# Patient Record
Sex: FEMALE | Race: WHITE | Employment: UNEMPLOYED | URBAN - METROPOLITAN AREA
[De-identification: names, ages, dates, MRNs, and addresses within clinical notes are randomized per-mention and may not be internally consistent; named-entity substitution may affect disease eponyms.]

---

## 2017-09-12 ENCOUNTER — HOSPITAL ENCOUNTER (OUTPATIENT)
Dept: MAMMOGRAPHY | Age: 52
Discharge: HOME | End: 2017-09-12
Attending: OBSTETRICS & GYNECOLOGY
Payer: COMMERCIAL

## 2017-09-12 DIAGNOSIS — Z12.31 VISIT FOR SCREENING MAMMOGRAM: ICD-10-CM

## 2017-09-12 PROCEDURE — 77067 SCR MAMMO BI INCL CAD: CPT | Performed by: OBSTETRICS & GYNECOLOGY

## 2018-12-21 ENCOUNTER — OFFICE VISIT (OUTPATIENT)
Dept: FAMILY MEDICINE CLINIC | Facility: CLINIC | Age: 53
End: 2018-12-21
Payer: COMMERCIAL

## 2018-12-21 VITALS
OXYGEN SATURATION: 98 % | BODY MASS INDEX: 24.8 KG/M2 | SYSTOLIC BLOOD PRESSURE: 98 MMHG | RESPIRATION RATE: 16 BRPM | HEART RATE: 98 BPM | TEMPERATURE: 98 F | DIASTOLIC BLOOD PRESSURE: 58 MMHG | WEIGHT: 140 LBS | HEIGHT: 63 IN

## 2018-12-21 DIAGNOSIS — J01.00 ACUTE MAXILLARY SINUSITIS, RECURRENCE NOT SPECIFIED: Primary | ICD-10-CM

## 2018-12-21 DIAGNOSIS — R05.9 COUGH: ICD-10-CM

## 2018-12-21 PROCEDURE — 99203 OFFICE O/P NEW LOW 30 MIN: CPT | Performed by: PHYSICIAN ASSISTANT

## 2018-12-21 RX ORDER — PSEUDOEPHEDRINE HYDROCHLORIDE 30 MG/1
30 TABLET ORAL EVERY 4 HOURS PRN
COMMUNITY
End: 2020-05-05 | Stop reason: ALTCHOICE

## 2018-12-21 RX ORDER — BENZONATATE 200 MG/1
200 CAPSULE ORAL 3 TIMES DAILY PRN
Qty: 21 CAPSULE | Refills: 0 | Status: SHIPPED | OUTPATIENT
Start: 2018-12-21 | End: 2018-12-28

## 2018-12-21 RX ORDER — AMOXICILLIN AND CLAVULANATE POTASSIUM 875; 125 MG/1; MG/1
1 TABLET, FILM COATED ORAL 2 TIMES DAILY
Qty: 20 TABLET | Refills: 0 | Status: SHIPPED | OUTPATIENT
Start: 2018-12-21 | End: 2018-12-31

## 2018-12-21 NOTE — PATIENT INSTRUCTIONS
Self-Care for Sinusitis     Drinking plenty of water can help sinuses drain. Sinusitis can often be managed with self-care. Self-care can keep sinuses moist and make you feel more comfortable. Remember to follow your doctor's instructions closely.  This Colds, flu, and allergies make it more likely for you to get sinusitis. Do your best to prevent sinusitis by preventing these problems. Do what you can to avoid getting colds and other infections. Stay away from things that cause allergies (allergens).  Edita Dowling · Stay away from all types of smoke, which dries out sinus linings. This includes tobacco smoke and chemical smoke in workplace settings. · Use saltwater rinses. Date Last Reviewed: 10/1/2016  © 4185-5867 [employer].  [address] [facility]

## 2019-09-26 ENCOUNTER — HOSPITAL ENCOUNTER (OUTPATIENT)
Age: 54
Discharge: HOME | End: 2019-09-26
Payer: COMMERCIAL

## 2019-09-26 VITALS
WEIGHT: 130 LBS | SYSTOLIC BLOOD PRESSURE: 106 MMHG | HEIGHT: 62 IN | TEMPERATURE: 97 F | RESPIRATION RATE: 18 BRPM | OXYGEN SATURATION: 98 % | BODY MASS INDEX: 23.92 KG/M2 | DIASTOLIC BLOOD PRESSURE: 72 MMHG | HEART RATE: 83 BPM

## 2019-09-26 DIAGNOSIS — S61.011A LACERATION OF RIGHT THUMB WITHOUT FOREIGN BODY WITHOUT DAMAGE TO NAIL, INITIAL ENCOUNTER: Primary | ICD-10-CM

## 2019-09-26 PROCEDURE — 99213 OFFICE O/P EST LOW 20 MIN: CPT

## 2019-09-26 PROCEDURE — 12002 RPR S/N/AX/GEN/TRNK2.6-7.5CM: CPT

## 2019-09-26 PROCEDURE — 90471 IMMUNIZATION ADMIN: CPT

## 2019-09-27 NOTE — ED PROVIDER NOTES
Patient Seen in: [facility] In [facility]      History   Patient presents with:  Laceration Abrasion (integumentary)    Stated Complaint:  RIGHT HAND LACERATION    81-year-old female who presents to the immediate care with a laceration to the Current:/72   Pulse 83   Temp 96.8 °F (36 °C) (Temporal)   Resp 18   Ht 157.5 cm (5' 2\")   Wt 59 kg   LMP 08/26/2019 (Exact Date)   SpO2 98%   BMI 23.78 kg/m²         Physical Exam   Constitutional: She is oriented to person, place, and time.  She ap I have discussed with the patient and/or family the results of test, differential diagnosis, treatment plan, warning signs and symptoms which should prompt immediate return.   The patient and/or family expressed clear understanding of these instructions and

## 2019-09-27 NOTE — ED INITIAL ASSESSMENT (HPI)
Patient sustained a laceration to the right thumb on a broken wine glass. CMS adequate. Flap type laceration noted.

## 2019-10-08 ENCOUNTER — HOSPITAL ENCOUNTER (OUTPATIENT)
Age: 54
Discharge: HOME | End: 2019-10-08
Payer: COMMERCIAL

## 2019-10-08 VITALS
DIASTOLIC BLOOD PRESSURE: 66 MMHG | HEART RATE: 73 BPM | RESPIRATION RATE: 20 BRPM | OXYGEN SATURATION: 100 % | TEMPERATURE: 98 F | SYSTOLIC BLOOD PRESSURE: 107 MMHG

## 2019-10-08 DIAGNOSIS — Z48.02 ENCOUNTER FOR REMOVAL OF SUTURES: Primary | ICD-10-CM

## 2019-10-08 NOTE — ED INITIAL ASSESSMENT (HPI)
Patient presents for suture removal to right thumb x 12 days. Right hand dominent. Sutures appear dry and well approximated.

## 2019-10-08 NOTE — ED PROVIDER NOTES
Patient Seen in: [facility] In [facility]      History   Patient presents with:  Sut Stap RingRemoval (ingtegumentary)    Stated Complaint: stitch removal, [id number]     HPI    29-year-old female here for suture removal after 8 sutures ( in lie reactive to light. Conjunctivae and EOM are normal.   Neck: Normal range of motion. Neck supple. Cardiovascular: Normal rate, regular rhythm and normal heart sounds.    Pulmonary/Chest: Effort normal and breath sounds normal.   Neurological: She is alert

## 2020-05-05 ENCOUNTER — APPOINTMENT (OUTPATIENT)
Dept: GENERAL RADIOLOGY | Age: 55
End: 2020-05-05
Attending: FAMILY MEDICINE
Payer: COMMERCIAL

## 2020-05-05 ENCOUNTER — HOSPITAL ENCOUNTER (OUTPATIENT)
Age: 55
Discharge: HOME | End: 2020-05-05
Attending: FAMILY MEDICINE
Payer: COMMERCIAL

## 2020-05-05 VITALS
OXYGEN SATURATION: 98 % | SYSTOLIC BLOOD PRESSURE: 124 MMHG | RESPIRATION RATE: 20 BRPM | TEMPERATURE: 98 F | DIASTOLIC BLOOD PRESSURE: 53 MMHG | HEART RATE: 70 BPM

## 2020-05-05 DIAGNOSIS — S93.402A SPRAIN OF LEFT ANKLE, UNSPECIFIED LIGAMENT, INITIAL ENCOUNTER: ICD-10-CM

## 2020-05-05 DIAGNOSIS — S82.892A CLOSED AVULSION FRACTURE OF LEFT ANKLE, INITIAL ENCOUNTER: Primary | ICD-10-CM

## 2020-05-05 PROCEDURE — 99214 OFFICE O/P EST MOD 30 MIN: CPT

## 2020-05-05 PROCEDURE — 73610 X-RAY EXAM OF ANKLE: CPT | Performed by: FAMILY MEDICINE

## 2020-05-05 NOTE — ED PROVIDER NOTES
Patient Seen in: [facility] Immediate [facility] In [facility]      History   Patient presents with:   Ankle Injury    Stated Complaint: Left ankle pain,injury    HPI  This is a 62-year-old female coming in with complaints of left ankle pain, injury happened on Ap normocephalic  NECK: supple  LUNGS: No tachypnea   CARDIO: No tachycardia   GI: not distended     EXTREMITIES:   L ankle and foot exam:   - defect/deformity : none noted   - swelling/effusion: ++ noted along the soft tissue of the lateral malleolus   - bru fibula and the lateral cortical margin of the talus, suggesting a small avulsion injury, likely from the region of the ATF ligament insertion. However this finding is of uncertain acuity. No additional osseous lesions are identified.   No discrete radiogr

## 2020-05-05 NOTE — ED INITIAL ASSESSMENT (HPI)
States rolled left ankle 4/8. Wore an ortho boot for 2 days after injury, then stopped. States initial eccymosis all over ankle and lateral foot, now traveled down to toes and cleared at ankle and foot.   Ambulatory with mild limp  States here because not

## 2020-09-08 PROCEDURE — 88175 CYTOPATH C/V AUTO FLUID REDO: CPT | Performed by: NURSE PRACTITIONER

## 2020-09-08 PROCEDURE — 87624 HPV HI-RISK TYP POOLED RSLT: CPT | Performed by: NURSE PRACTITIONER

## 2020-09-22 ENCOUNTER — APPOINTMENT (OUTPATIENT)
Dept: LAB | Age: 55
End: 2020-09-22
Attending: NURSE PRACTITIONER
Payer: COMMERCIAL

## 2020-09-22 ENCOUNTER — HOSPITAL ENCOUNTER (OUTPATIENT)
Dept: MAMMOGRAPHY | Age: 55
Discharge: HOME | End: 2020-09-22
Attending: NURSE PRACTITIONER
Payer: COMMERCIAL

## 2020-09-22 DIAGNOSIS — Z12.31 VISIT FOR SCREENING MAMMOGRAM: ICD-10-CM

## 2020-09-22 PROCEDURE — 80048 BASIC METABOLIC PNL TOTAL CA: CPT | Performed by: NURSE PRACTITIONER

## 2020-09-22 PROCEDURE — 82670 ASSAY OF TOTAL ESTRADIOL: CPT | Performed by: NURSE PRACTITIONER

## 2020-09-22 PROCEDURE — 85025 COMPLETE CBC W/AUTO DIFF WBC: CPT | Performed by: NURSE PRACTITIONER

## 2020-09-22 PROCEDURE — 83002 ASSAY OF GONADOTROPIN (LH): CPT | Performed by: NURSE PRACTITIONER

## 2020-09-22 PROCEDURE — 77067 SCR MAMMO BI INCL CAD: CPT | Performed by: NURSE PRACTITIONER

## 2020-09-22 PROCEDURE — 84443 ASSAY THYROID STIM HORMONE: CPT | Performed by: NURSE PRACTITIONER

## 2020-09-22 PROCEDURE — 83001 ASSAY OF GONADOTROPIN (FSH): CPT | Performed by: NURSE PRACTITIONER

## 2020-09-22 PROCEDURE — 80061 LIPID PANEL: CPT | Performed by: NURSE PRACTITIONER

## 2020-09-22 PROCEDURE — 36415 COLL VENOUS BLD VENIPUNCTURE: CPT | Performed by: NURSE PRACTITIONER

## 2020-09-22 PROCEDURE — 77063 BREAST TOMOSYNTHESIS BI: CPT | Performed by: NURSE PRACTITIONER

## 2020-11-04 ENCOUNTER — APPOINTMENT (OUTPATIENT)
Dept: LAB | Age: 55
End: 2020-11-04
Attending: NURSE PRACTITIONER
Payer: COMMERCIAL

## 2020-11-04 PROCEDURE — 36415 COLL VENOUS BLD VENIPUNCTURE: CPT | Performed by: NURSE PRACTITIONER

## 2020-11-04 PROCEDURE — 85025 COMPLETE CBC W/AUTO DIFF WBC: CPT | Performed by: NURSE PRACTITIONER

## 2020-11-10 ENCOUNTER — TELEMEDICINE (OUTPATIENT)
Dept: INTERNAL MEDICINE CLINIC | Facility: CLINIC | Age: 55
End: 2020-11-10
Payer: COMMERCIAL

## 2020-11-10 DIAGNOSIS — Z20.822 ENCOUNTER BY TELEHEALTH FOR SUSPECTED COVID-19: Primary | ICD-10-CM

## 2020-11-10 PROCEDURE — 99213 OFFICE O/P EST LOW 20 MIN: CPT | Performed by: NURSE PRACTITIONER

## 2020-11-11 ENCOUNTER — LAB ENCOUNTER (OUTPATIENT)
Dept: LAB | Facility: HOSPITAL | Age: 55
End: 2020-11-11
Attending: NURSE PRACTITIONER
Payer: COMMERCIAL

## 2020-11-11 DIAGNOSIS — Z20.822 EXPOSURE TO COVID-19 VIRUS: Primary | ICD-10-CM

## 2021-08-09 ENCOUNTER — TELEPHONE (OUTPATIENT)
Dept: INTERNAL MEDICINE CLINIC | Facility: CLINIC | Age: 56
End: 2021-08-09

## 2021-08-09 NOTE — TELEPHONE ENCOUNTER
Incoming fax with patients Covid vaccination     Abhinav [id number]  Lot #- [id number]  Expires-11/9/2021     Left arm    Epic updated    Sent to scan

## 2021-09-13 ENCOUNTER — TELEPHONE (OUTPATIENT)
Dept: INTERNAL MEDICINE CLINIC | Facility: CLINIC | Age: 56
End: 2021-09-13

## 2021-09-13 NOTE — TELEPHONE ENCOUNTER
Incoming fax from [id number] [place] [address] [address]    Patients Covid vaccine record     2nd vaccine done on 9/11/2021     Lot #-[id number]    NDC_ [id number]    Exp date- 1/22/2022     Epic updated

## 2024-09-06 NOTE — PROGRESS NOTES
Virtual Telephone Check-In    Kylah Cuevas verbally consents to a Virtual/Telephone Check-In visit on 11/10/20. Patient verified identification with name and date of birth.      Patient understands and accepts financial responsibility for any deductible, c BY PCR (); Future      The patient is asked to call if any worsening in symptoms, SOB, CP. To self-isolate for 10 days since symptom onset. Encourage adequate rest and fluids, bland diet.  The patient indicates understanding of these issues and agrees Satisfactory

## 2024-12-09 NOTE — PROGRESS NOTES
CHIEF COMPLAINT:   Patient presents with:  Sinusitis    HPI:   Richard Scott is a 47year old female who presents for sinus congestion for  10  days. Symptoms have been worsening since onset.  Sinus congestion/pain is described as a pressure and is located BP 98/58 (BP Location: Left arm)   Pulse 98   Temp 98.2 °F (36.8 °C) (Oral)   Resp 16   Ht 63\"   Wt 140 lb   LMP 12/07/2018 (Approximate)   SpO2 98%   Breastfeeding?  No   BMI 24.80 kg/m²   GENERAL: well developed, well nourished, and in no apparent distre Sinusitis can often be managed with self-care. Self-care can keep sinuses moist and make you feel more comfortable. Remember to follow your doctor's instructions closely. This can make a big difference in getting your sinus problem under control.   Drink fl Colds, flu, and allergies make it more likely for you to get sinusitis. Do your best to prevent sinusitis by preventing these problems. Do what you can to avoid getting colds and other infections. Stay away from things that cause allergies (allergens).  Lin Parker · Stay away from all types of smoke, which dries out sinus linings. This includes tobacco smoke and chemical smoke in workplace settings. · Use saltwater rinses. Date Last Reviewed: 10/1/2016  © 2741-4262 [employer].  [address] [facility] [id number] OBS or IP - moderate complexity OR 35-49 mins [id number] OBS or IP - moderate complexity OR 35-49 mins

## (undated) NOTE — LETTER
Date: 11/10/2020    Patient Name: Samantha Aguilar      To Whom it may concern,    This letter has been written at the patient's request. Due to the COVID-19 pandemic and in conjunction with the CDC's recommendations to reduce the exposure and risk of infecti